# Patient Record
(demographics unavailable — no encounter records)

---

## 2025-04-02 NOTE — ASSESSMENT
[FreeTextEntry1] : The patient is a 67 yo man presenting with right knee pain as a result of an injury on the LIRR after his knee buckled due to him losing consciousness. This occurred in November. He has had pain with ambulation and ADLs since then. He had an MRI from [facility] as well. He then fell over a cooler two days later causing more pain. He wore a brace and completed PT and has since continued to have pain with overuse and athletic activity. He denies paresthesias. He has pain with squatting and pivoting.  He has trouble walking distances.  He has mild pain with stairs.  He seems to be okay riding his stationary bike but needs to ice afterwards.  He has had no injections.  Examination of the right lower extremity reveals normal neurovascular exam.  Examination the right knee reveals a full range of motion with a trace effusion.  He has lateral joint line tenderness with a positive Liz's sign.  There is no medial joint complaints.  There is no instability.  There is no redness, rashes or visible scars.  X-rays done in the office today of the right knee 4 views weightbearing shows no evidence of arthritis, loose bodies tumors mass or calcification.  Normal study.  I independently reviewed MRIs from [facility] radiology from November 7, 2024 and January 29, 2025.  Both show complex tear of the lateral meniscus at the mid body with an unstable flap.  There was significant bone bruise along the posterior tibia on the initial study but that has resolved.  There is minimal degenerative changes and no obvious defects in the cartilage.  The ligaments are all intact.  My recommendation at this time is that we proceed with a right knee arthroscopy and partial lateral meniscectomy.  He has failed more than 3 months of conservative management. All risks, benefits and alternatives of the procedure were discussed with the patient in detail and he wishes to proceed.  He understands that he may require a postop cortisone injection if his symptoms do not improve within the first 6 to 8 weeks.

## 2025-04-02 NOTE — ASSESSMENT
[FreeTextEntry1] : The patient is a 67 yo man presenting with right knee pain as a result of an injury on the LIRR after his knee buckled due to him losing consciousness. This occurred in November. He has had pain with ambulation and ADLs since then. He had an MRI from [facility] as well. He then fell over a cooler two days later causing more pain. He wore a brace and completed PT and has since continued to have pain with overuse and athletic activity. He denies paresthesias. He has pain with squatting and pivoting.  He has trouble walking distances.  He has mild pain with stairs.  He seems to be okay riding his stationary bike but needs to ice afterwards.  He has had no injections.  Examination of the right lower extremity reveals normal neurovascular exam.  Examination the right knee reveals a full range of motion with a trace effusion.  He has lateral joint line tenderness with a positive Liz's sign.  There is no medial joint complaints.  There is no instability.  There is no redness, rashes or visible scars.  X-rays done in the office today of the right knee 4 views weightbearing shows no evidence of arthritis, loose bodies tumors mass or calcification.  Normal study.  I independently reviewed MRIs from [facility] radiology from November 7, 2024 and January 29, 2025.  Both show complex tear of the lateral meniscus at the mid body with an unstable flap.  There was significant bone bruise along the posterior tibia on the initial study but that has resolved.  There is minimal degenerative changes and no obvious defects in the cartilage.  The ligaments are all intact.  My recommendation at this time is that we proceed with a right knee arthroscopy and partial lateral meniscectomy.  He has failed more than 3 months of conservative management. All risks, benefits and alternatives of the procedure were discussed with the patient in detail and he wishes to proceed.  He understands that he may require a postop cortisone injection if his symptoms do not improve within the first 6 to 8 weeks.

## 2025-04-02 NOTE — HISTORY OF PRESENT ILLNESS
[8] : 8 [Dull/Aching] : dull/aching [Sharp] : sharp [Constant] : constant [Walking] : walking [Stairs] : stairs [] : no [FreeTextEntry5] : 67 y/o M presents for a NP visit. has had pain since November. has pain when walking . tylenol as needed .

## 2025-04-30 NOTE — ASSESSMENT
[FreeTextEntry1] : The patient is 2 weeks s/p a right knee arthroscopy and partial lateral meniscectomy on 4/18/25. The patient denies fever, chills, CP, SOB. The patient denies drainage or discharge from their incision. The patient is doing well postoperatively. The patient continues to have some ROM deficits at EROM flexion. He can extend fully. He has been walking a [facility] He had moderate pain after with oral AI use. He is set to begin PT as well.   Right knee exam: The patient is in no apparent distress. Neurovascularly intact. Sensation to right lower extremity. 2+ pulses to posterior tibialis. Operative incision is clean, dry, and intact. Sutures removed. Steris placed. No active drainage or discharge. No signs of infection or DVT. Tender to palpation to lateral joint line. Ranging  0-115. - Crepitus.  - pain with varus/valgus stress. - anterior/posterior drawer.  The patient is doing well postop. He will begin PT and was given a prescription. He will return in 4 weeks with Dr. Jin.

## 2025-04-30 NOTE — ASSESSMENT
[FreeTextEntry1] : The patient is 2 weeks s/p a right knee arthroscopy and partial lateral meniscectomy on 4/18/25. The patient denies fever, chills, CP, SOB. The patient denies drainage or discharge from their incision. The patient is doing well postoperatively. The patient continues to have some ROM deficits at EROM flexion. He can extend fully. He has been walking a lot and travelled to [facility]. He had moderate pain after with oral AI use. He is set to begin PT as well.   Right knee exam: The patient is in no apparent distress. Neurovascularly intact. Sensation to right lower extremity. 2+ pulses to posterior tibialis. Operative incision is clean, dry, and intact. Sutures removed. Steris placed. No active drainage or discharge. No signs of infection or DVT. Tender to palpation to lateral joint line. Ranging  0-115. - Crepitus.  - pain with varus/valgus stress. - anterior/posterior drawer.  The patient is doing well postop. He will begin PT and was given a prescription. He will return in 4 weeks with Dr. Jin.

## 2025-04-30 NOTE — HISTORY OF PRESENT ILLNESS
[Meds] : meds [FreeTextEntry5] : 65 y/o M presents for PO #1 eval today. Pt reports of stiffness and moderate pain levels with recovery. Swelling persists. Tylenol as needed.

## 2025-06-14 NOTE — ASSESSMENT
[FreeTextEntry1] : The patient is 7 weeks s/p a right knee arthroscopy and partial lateral meniscectomy on 4/18/25. The patient is doing well postoperatively. The patient continues to have stiffness and pain with activity. He has pain after riding a bike. He reports that he had better pain relief right after surgery and pain has since crept back. He has difficulty with going both up and down stairs.  Right knee exam: The patient is in no apparent distress. Neurovascularly intact. Sensation to right lower extremity. 2+ pulses to posterior tibialis. Operative incisions are well healed. No active drainage or discharge. No signs of infection or DVT. Tender to palpation to lateral joint line. Ranging  0-115 with pain at hyperflexion. - Crepitus.  - pain with varus/valgus stress. - anterior/posterior drawer.   Under sterile conditions the right knee was injected with 5 cc of quarter percent plain Marcaine; 5 cc of 2% plain lidocaine and 80 mg of Depo-Medrol.  Patient tolerated the procedure well.  Plan is continue physical therapy.  Will see him back in the office in a few weeks for possible repeat cortisone injection prior to a fishing trip to [place] at the end of August.

## 2025-06-14 NOTE — HISTORY OF PRESENT ILLNESS
[Meds] : meds [7] : 7 [2] : 2 [Stairs] : stairs [FreeTextEntry5] : 65 y/o M presents for PO #2 eval today. States pain is worse than before his operation. Trouble with going up and down the stairs. Continuing with PT. Tylenol prn.

## 2025-06-14 NOTE — HISTORY OF PRESENT ILLNESS
[Meds] : meds [7] : 7 [2] : 2 [Stairs] : stairs [FreeTextEntry5] : 67 y/o M presents for PO #2 eval today. States pain is worse than before his operation. Trouble with going up and down the stairs. Continuing with PT. Tylenol prn.

## 2025-06-14 NOTE — ASSESSMENT
[FreeTextEntry1] : The patient is 7 weeks s/p a right knee arthroscopy and partial lateral meniscectomy on 4/18/25. The patient is doing well postoperatively. The patient continues to have stiffness and pain with activity. He has pain after riding a bike. He reports that he had better pain relief right after surgery and pain has since crept back. He has difficulty with going both up and down stairs.  Right knee exam: The patient is in no apparent distress. Neurovascularly intact. Sensation to right lower extremity. 2+ pulses to posterior tibialis. Operative incisions are well healed. No active drainage or discharge. No signs of infection or DVT. Tender to palpation to lateral joint line. Ranging  0-115 with pain at hyperflexion. - Crepitus.  - pain with varus/valgus stress. - anterior/posterior drawer.   Under sterile conditions the right knee was injected with 5 cc of quarter percent plain Marcaine; 5 cc of 2% plain lidocaine and 80 mg of Depo-Medrol.  Patient tolerated the procedure well.  Plan is continue physical therapy.  Will see him back in the office in a few weeks for possible repeat cortisone injection prior to a fishing trip to [place] at the end of August.

## 2025-07-29 NOTE — PHYSICAL EXAM
[Midline] : trachea located in midline position [Normal] : no rashes [Nasal Endoscopy Performed] : nasal endoscopy was performed, see procedure section for findings [] : septum deviated to the right [Removed] : palatine tonsils previously removed [Laryngoscopy Performed] : laryngoscopy was performed, see procedure section for findings [de-identified] : mod inflammation both eac  [de-identified] : mod exudate pharynx  - throat culture done

## 2025-07-29 NOTE — REASON FOR VISIT
[Initial Consultation] : an initial consultation for [Ear Pain] : ear pain [FreeTextEntry2] : congested , sore throat

## 2025-07-29 NOTE — REVIEW OF SYSTEMS
[Ear Pain] : ear pain [Negative] : Heme/Lymph [Ear Drainage] : no ear drainage [Ear Noises] : no ear noises [de-identified] : pain going down towards neck , pt swim a lot  , chronic ear pain